# Patient Record
Sex: FEMALE | Race: WHITE
[De-identification: names, ages, dates, MRNs, and addresses within clinical notes are randomized per-mention and may not be internally consistent; named-entity substitution may affect disease eponyms.]

---

## 2018-05-09 ENCOUNTER — RX RENEWAL (OUTPATIENT)
Age: 63
End: 2018-05-09

## 2019-05-03 ENCOUNTER — RX RENEWAL (OUTPATIENT)
Age: 64
End: 2019-05-03

## 2019-07-31 ENCOUNTER — NON-APPOINTMENT (OUTPATIENT)
Age: 64
End: 2019-07-31

## 2019-07-31 ENCOUNTER — APPOINTMENT (OUTPATIENT)
Dept: CARDIOLOGY | Facility: CLINIC | Age: 64
End: 2019-07-31
Payer: COMMERCIAL

## 2019-07-31 VITALS
HEART RATE: 81 BPM | DIASTOLIC BLOOD PRESSURE: 82 MMHG | HEIGHT: 62 IN | WEIGHT: 164 LBS | OXYGEN SATURATION: 96 % | BODY MASS INDEX: 30.18 KG/M2 | RESPIRATION RATE: 16 BRPM | SYSTOLIC BLOOD PRESSURE: 130 MMHG

## 2019-07-31 DIAGNOSIS — Z00.00 ENCOUNTER FOR GENERAL ADULT MEDICAL EXAMINATION W/OUT ABNORMAL FINDINGS: ICD-10-CM

## 2019-07-31 PROCEDURE — 99214 OFFICE O/P EST MOD 30 MIN: CPT

## 2019-07-31 PROCEDURE — 93000 ELECTROCARDIOGRAM COMPLETE: CPT

## 2019-07-31 RX ORDER — ATORVASTATIN CALCIUM 20 MG/1
20 TABLET, FILM COATED ORAL
Refills: 0 | Status: ACTIVE | COMMUNITY

## 2019-07-31 RX ORDER — MESALAMINE 1.2 G/1
TABLET, DELAYED RELEASE ORAL
Refills: 0 | Status: ACTIVE | COMMUNITY

## 2019-07-31 RX ORDER — FENOFIBRATE 145 MG/1
145 TABLET, COATED ORAL
Refills: 0 | Status: ACTIVE | COMMUNITY

## 2019-08-01 NOTE — ASSESSMENT
[FreeTextEntry1] : 3/18/14 ECHO Summary:\par 1. Normal left ventricular size and wall thickness, with normal systolic and diastolic function.\par 2. Left ventricular ejection fraction, by visual estimation, is 55 to 60%.\par 3. Normal right ventricular size and systolic function.\par 4. The left atrium is normal in size and structure.\par 5. The right atrium is normal in size and structure.\par 6. Trace mitral valve regurgitation.\par 7. Mild tricuspid regurgitation.\par 8. The pulmonic valve is trileaflet, without evidence of stenosis or insufficiency.\par 9. Trace pulmonic valve regurgitation.\par 10. Mild aortic valve sclerosis without stenosis.\par 11. Interatrial and interventricular septa appear intact, with no echocardiographic evidence of\par intracardiac shunting.\par 12. The Ascending Aorta measures at the upper limit of normal.\par \par ECG:  Normal sinus rhythm at 80.  Poor R-wave progression.  Non-specific T-wave abnormalities. \par \par Laboratory data 19:  \par 1.  Cholesterol 183. \par 2.  HDL 45.  \par 3.  . \par 4.  Triglycerides 197. \par 5.  Electrolytes and LFTs are normal. \par 6.  A1C 5.5.  \par \par Impression:\par 1.  Hypertension seems to be adequately controlled. \par 2.  Lipid management is reasonable. \par 3.  There is no regular exercise regimen. \par 4.  The patient does have three risk factors for coronary artery disease and she is passed the age when her mother  of an MI.  As such, recommending patient undergo repeat evaluation to include an exercise stress test and an echocardiogram.  Will regroup following the above.  Future bloodwork to be forwarded here for review.   \par

## 2019-08-01 NOTE — REASON FOR VISIT
[FreeTextEntry1] : Patient presents here for cardiac reevaluation.  History includes:\par 1.  Hyperlipidemia.\par 2.  Hypertension.\par 3.  A family history of premature coronary artery disease.  Mother  of MI at age 64.   \par \par Physically active although does not regularly exercise.  Denies any new symptoms of chest pain, shortness of breath, palpitations, PND, orthopnea, edema, syncope, or near-syncope.  No significant new or intercurrent medical problems.  \par \par No history of diabetes.  Stopped smoking 30 years ago. \par \par  \par \par   \par

## 2019-08-30 ENCOUNTER — RX RENEWAL (OUTPATIENT)
Age: 64
End: 2019-08-30

## 2019-11-12 ENCOUNTER — APPOINTMENT (OUTPATIENT)
Dept: CARDIOLOGY | Facility: CLINIC | Age: 64
End: 2019-11-12
Payer: COMMERCIAL

## 2019-11-12 PROCEDURE — 93306 TTE W/DOPPLER COMPLETE: CPT

## 2019-11-12 PROCEDURE — 93015 CV STRESS TEST SUPVJ I&R: CPT

## 2019-11-26 ENCOUNTER — APPOINTMENT (OUTPATIENT)
Dept: CARDIOLOGY | Facility: CLINIC | Age: 64
End: 2019-11-26
Payer: COMMERCIAL

## 2019-11-26 ENCOUNTER — NON-APPOINTMENT (OUTPATIENT)
Age: 64
End: 2019-11-26

## 2019-11-26 VITALS
OXYGEN SATURATION: 97 % | HEIGHT: 62 IN | RESPIRATION RATE: 16 BRPM | HEART RATE: 85 BPM | DIASTOLIC BLOOD PRESSURE: 74 MMHG | WEIGHT: 164 LBS | BODY MASS INDEX: 30.18 KG/M2 | SYSTOLIC BLOOD PRESSURE: 120 MMHG

## 2019-11-26 PROCEDURE — 93000 ELECTROCARDIOGRAM COMPLETE: CPT

## 2019-11-26 PROCEDURE — 99214 OFFICE O/P EST MOD 30 MIN: CPT

## 2019-11-26 NOTE — REASON FOR VISIT
[FreeTextEntry1] : Patient presents here for cardiac reevaluation.  History includes:\par 1.  Hyperlipidemia.\par 2.  Hypertension.\par 3.  A family history of premature coronary artery disease.  Mother  of MI at age 64.   \par \par Physically active although does not regularly exercise.  Denies any new symptoms of chest pain, shortness of breath, palpitations, PND, orthopnea, edema, syncope, or near-syncope.  No significant new or intercurrent medical problems.  \par \par No history of diabetes.  Stopped smoking 30 years ago. \par \par Here to review results of recent testing\par \par  \par \par   \par

## 2020-08-10 ENCOUNTER — RX RENEWAL (OUTPATIENT)
Age: 65
End: 2020-08-10

## 2020-12-07 ENCOUNTER — NON-APPOINTMENT (OUTPATIENT)
Age: 65
End: 2020-12-07

## 2020-12-07 ENCOUNTER — APPOINTMENT (OUTPATIENT)
Dept: CARDIOLOGY | Facility: CLINIC | Age: 65
End: 2020-12-07
Payer: COMMERCIAL

## 2020-12-07 VITALS
DIASTOLIC BLOOD PRESSURE: 85 MMHG | WEIGHT: 165 LBS | SYSTOLIC BLOOD PRESSURE: 120 MMHG | TEMPERATURE: 96.9 F | HEIGHT: 62 IN | HEART RATE: 94 BPM | RESPIRATION RATE: 16 BRPM | OXYGEN SATURATION: 98 % | BODY MASS INDEX: 30.36 KG/M2

## 2020-12-07 DIAGNOSIS — I10 ESSENTIAL (PRIMARY) HYPERTENSION: ICD-10-CM

## 2020-12-07 DIAGNOSIS — E78.5 HYPERLIPIDEMIA, UNSPECIFIED: ICD-10-CM

## 2020-12-07 DIAGNOSIS — Z82.49 FAMILY HISTORY OF ISCHEMIC HEART DISEASE AND OTHER DISEASES OF THE CIRCULATORY SYSTEM: ICD-10-CM

## 2020-12-07 DIAGNOSIS — Z86.39 PERSONAL HISTORY OF OTHER ENDOCRINE, NUTRITIONAL AND METABOLIC DISEASE: ICD-10-CM

## 2020-12-07 PROCEDURE — 99214 OFFICE O/P EST MOD 30 MIN: CPT

## 2020-12-07 PROCEDURE — 99072 ADDL SUPL MATRL&STAF TM PHE: CPT

## 2020-12-07 PROCEDURE — 93000 ELECTROCARDIOGRAM COMPLETE: CPT

## 2020-12-07 RX ORDER — OMEGA-3/DHA/EPA/FISH OIL 910-1400MG
1400 CAPSULE ORAL DAILY
Qty: 90 | Refills: 0 | Status: ACTIVE | COMMUNITY
Start: 2020-12-07

## 2020-12-07 RX ORDER — FOLIC ACID/MULTIVIT,IRON,MINER 0.4MG-18MG
TABLET ORAL
Refills: 0 | Status: DISCONTINUED | COMMUNITY
End: 2020-12-07

## 2020-12-07 NOTE — REASON FOR VISIT
[FreeTextEntry1] : Patient presents here for cardiac reevaluation.  History includes:\par 1.  Hyperlipidemia.\par 2.  Hypertension.\par 3.  A family history of premature coronary artery disease.  Mother  of MI at age 64.   \par \par Physically active although does not regularly exercise.  Denies any new symptoms of chest pain, shortness of breath, palpitations, PND, orthopnea, edema, syncope, or near-syncope.  No significant new or intercurrent medical problems.  \par \par No history of diabetes.  Stopped smoking 30 years ago. \par \par \par  \par \par   \par

## 2020-12-07 NOTE — ASSESSMENT
[FreeTextEntry1] : ECG sinus rhythm at 94. Poor R wave  progression. No significant ST or T wave changes.\par \par Laboratory data \par        9/26/19:    9/25/20\par Chol    178        184\par HDL      44          40\par LDL      99         113\par \par Trigl    230         190\par A1c    5.3           5.4\par hemoglobin 13.7\par Electrolytes and LFTs are normal\par \par Laboratory data 6/28/19:  \par 1.  Cholesterol 183. \par 2.  HDL 45.  \par 3.  . \par 4.  Triglycerides 197. \par 5.  Electrolytes and LFTs are normal. \par 6.  A1C 5.5.  \par \par \par Echocardiogram 11/12/19:\par Normal LV size and function without any significant valvular heart disease.\par \par Exercise stress test 11/12/19\par Exercise 6 minutes 20 seconds. Heart rate 162 more than 100%.\par Stopped because of fatigue\par 1 mm nondiagnostic upsloping ST segment abnormalities.\par \par Impression:\par 1.  the patient has no evidence of hypertensive heart disease.\par \par 2. Exercise tolerance is reduced and consistent with deconditioning.\par \par 3. The triglycerides remain elevated and krill oil was changed to omega-3 fatty acids\par      Increase in LDL is noted.\par      No appreciable change in weight.\par \par 4. The EKG abnormality of poor R wave progression does not correlate with any wall motion abnormality.\par \par 5. Patient encouraged with regard to regular exercise to improve her overall functional capacity. No other cardiac testing. \par \par 6.  Patient will forward me results of repeat blood work in January.\par \par Call if any new symptoms. Otherwise followup in one year.\par \par \par \par \par

## 2020-12-07 NOTE — HISTORY OF PRESENT ILLNESS
[FreeTextEntry1] : States that her diet is reasonable.\par A lot of work-related stress is related.\par Recently switched from Krill oil to omega-3 fatty acids because of elevated triglycerides.\par

## 2021-01-14 ENCOUNTER — RX RENEWAL (OUTPATIENT)
Age: 66
End: 2021-01-14

## 2021-12-22 ENCOUNTER — RX RENEWAL (OUTPATIENT)
Age: 66
End: 2021-12-22

## 2021-12-22 RX ORDER — TRIAMTERENE AND HYDROCHLOROTHIAZIDE 37.5; 25 MG/1; MG/1
37.5-25 CAPSULE ORAL
Qty: 90 | Refills: 1 | Status: ACTIVE | COMMUNITY
Start: 2018-05-09 | End: 1900-01-01

## 2022-03-01 ENCOUNTER — APPOINTMENT (OUTPATIENT)
Dept: CARDIOLOGY | Facility: CLINIC | Age: 67
End: 2022-03-01

## 2022-03-01 VITALS
SYSTOLIC BLOOD PRESSURE: 130 MMHG | RESPIRATION RATE: 16 BRPM | DIASTOLIC BLOOD PRESSURE: 75 MMHG | OXYGEN SATURATION: 98 % | HEART RATE: 78 BPM | WEIGHT: 169 LBS | HEIGHT: 62 IN | BODY MASS INDEX: 31.1 KG/M2

## 2022-03-07 NOTE — ASSESSMENT
[FreeTextEntry1] : ECG; normal sinus rhythm at 78.  Poor R progression.  Nonspecific ST-T wave changes.\par \par Laboratory data \par -----9/26/19---9/25/20----2/4/22\par Chol---178-----184--------206\par HDL-----44------40---------37\par LDL ----99------113-------123\par \par Trig----230-----190--------323\par Z6h----5.3------5.4--------5.3\par hemoglobin 13.7\par Electrolytes and LFTs are normal\par \par Laboratory data 6/28/19:  \par 1.  Cholesterol 183. \par 2.  HDL 45.  \par 3.  . \par 4.  Triglycerides 197. \par 5.  Electrolytes and LFTs are normal. \par 6.  A1C 5.5.  \par \par \par Echocardiogram 11/12/19:\par Normal LV size and function without any significant valvular heart disease.\par \par Exercise stress test 11/12/19\par Exercise 6 minutes 20 seconds. Heart rate 162 more than 100%.\par Stopped because of fatigue\par 1 mm nondiagnostic upsloping ST segment abnormalities.\par \par Impression:\par 1.  the patient has no evidence of hypertensive heart disease.\par \par 2. Exercise tolerance is reduced and consistent with deconditioning.\par \par 3. The triglycerides remain elevated and krill oil was changed to omega-3 fatty acids\par      Increase in LDL is noted.\par      No appreciable change in weight.\par \par 4. The EKG abnormality of poor R wave progression does not correlate with any wall motion abnormality.\par \par 5. Patient encouraged with regard to regular exercise to improve her overall functional capacity. No other cardiac testing. \par \par 6.  Patient will forward me results of repeat blood work in January.\par \par Call if any new symptoms. Otherwise followup in one year.\par \par \par \par \par